# Patient Record
Sex: MALE | Race: WHITE | NOT HISPANIC OR LATINO | Employment: FULL TIME | URBAN - METROPOLITAN AREA
[De-identification: names, ages, dates, MRNs, and addresses within clinical notes are randomized per-mention and may not be internally consistent; named-entity substitution may affect disease eponyms.]

---

## 2019-07-19 ENCOUNTER — THERAPY VISIT (OUTPATIENT)
Dept: PHYSICAL THERAPY | Facility: CLINIC | Age: 44
End: 2019-07-19
Payer: OTHER MISCELLANEOUS

## 2019-07-19 DIAGNOSIS — M54.50 LOW BACK PAIN: ICD-10-CM

## 2019-07-19 PROCEDURE — 97161 PT EVAL LOW COMPLEX 20 MIN: CPT | Mod: GP | Performed by: PHYSICAL THERAPIST

## 2019-07-19 PROCEDURE — 97110 THERAPEUTIC EXERCISES: CPT | Mod: GP | Performed by: PHYSICAL THERAPIST

## 2019-07-19 PROCEDURE — 97530 THERAPEUTIC ACTIVITIES: CPT | Mod: GP | Performed by: PHYSICAL THERAPIST

## 2019-07-19 NOTE — LETTER
[facility] PHYSICAL THERAPY  [id number]  [place] [address]  [phone]    2019    Re: Steve Josue   :   1975  MRN:  [id number]   REFERRING PHYSICIAN:   Demetrius Rice    [facility] PHYSICAL THERAPY    Date of Initial Evaluation:  19  Visits:  Rxs Used: 1  Reason for Referral:  Low back pain    EVALUATION SUMMARY    [facility] Initial Evaluation  Subjective:  The history is provided by the patient.   Type of problem:  Lumbar  Condition occurred with:  Bending. This is a new condition   Problem details: Pt reports onset of LBP/tailbone pain after bending forward to load up his hitch on his truck. Happened at end of  a few weeks ago. Currently locates pain more so in tail bone/sacral area. Describes pain as like a bruise. Pain worse with bouncing up/down in truck, walking around for long distances, bending forward. Pain is intermittent, does not have a predictable pattern as to when it will come about and reported as 1/10 on pain scale. Pain better with ice and OTC medication. Pt works as a .    General health as reported by patient is good. Pertinent medical history includes:  Smoking.    Surgeries include:  None.  Current medications:  None.   Primary job tasks include:  Lifting/carrying, prolonged sitting and driving.                   Objective:    Lumbar/SI Evaluation  ROM:    AROM Lumbar:   Flexion:          To floor, 1/10 pain  Ext:                    100% ROM, 1/10 pain   Side Bend:        Left:  To knee, 0/10 pain    Right:  To knee, 0/10 pain  Rotation:           Left:     Right:   Side Glide:        Left:     Right:         Lumbar Provocation:  Lumbar provocation: Provocation of pain with PA glides at L3-L5.      Hip Evaluation    Hip Strength:    Flexion:   Left: 5/5   Pain:  Right: 5/5   Pain:  Extension:  Left: 4+/5  Pain:Right: 4+/5    Pain:    Abduction:  Left: 4+/5     Pain:Right: 4+/5    Pain:    Knee Flexion:   Left: 5/5   Pain:Right: 5/5   Pain:  Knee Extension:  Left: 5/5   Pain:Right: 5/5    Pain:      Assessment/Plan:    Patient is a 43 year old male with lumbar complaints.    Patient has the following significant findings with corresponding treatment plan.                Diagnosis 1:  LBP  Pain -  hot/cold therapy, US, electric stimulation, mechanical traction, manual therapy, splint/taping/bracing/orthotics, self management, education, directional preference exercise and home program  Decreased ROM/flexibility - manual therapy, therapeutic exercise, therapeutic activity and home program  Decreased joint mobility - manual therapy, therapeutic exercise, therapeutic activity and home program  Decreased strength - therapeutic exercise, therapeutic activities and home program  Impaired muscle performance - neuro re-education and home program  Decreased function - therapeutic activities and home program  Impaired posture - neuro re-education, therapeutic activities and home program    Therapy Evaluation Codes:   1) History comprised of:   Personal factors that impact the plan of care:      None.    Comorbidity factors that impact the plan of care are:      Smoking.     Medications impacting care: None.  2) Examination of Body Systems comprised of:   Body structures and functions that impact the plan of care:      Lumbar spine.   Activity limitations that impact the plan of care are:      Driving, Lifting, Sitting and Walking.  3) Clinical presentation characteristics are:   Stable/Uncomplicated.  4) Decision-Making    Low complexity using standardized patient assessment instrument and/or measureable assessment of functional outcome.  Cumulative Therapy Evaluation is: Low complexity.    Previous and current functional limitations:  (See Goal Flow Sheet for this information)    Short term and Long term goals: (See Goal Flow Sheet for this information)     Communication ability:  Patient appears to be able to clearly  communicate and understand verbal and written communication and follow directions correctly.  Treatment Explanation - The following has been discussed with the patient:   RX ordered/plan of care  Anticipated outcomes  Possible risks and side effects  This patient would benefit from PT intervention to resume normal activities.   Rehab potential is good.    Frequency:  1 X week, once daily  Duration:  for 6 weeks  Discharge Plan:  Achieve all LTG.  Independent in home treatment program.  Reach maximal therapeutic benefit.      Thank you for your referral.        INQUIRIES  Therapist: Fuad Brar DPT   [facility] PHYSICAL THERAPY  [address] [place] [id number]  Phone: [phone]  Fax: [phone]

## 2019-07-19 NOTE — PROGRESS NOTES
[facility] Initial Evaluation  Subjective:  The history is provided by the patient.   Type of problem:  Lumbar   Condition occurred with:  Bending. This is a new condition   Problem details: Pt reports onset of LBP/tailbone pain after bending forward to load up his hitch on his truck. Happened at end of June a few weeks ago. Currently locates pain more so in tail bone/sacral area. Describes pain as like a bruise. Pain worse with bouncing up/down in truck, walking around for long distances, bending forward. Pain is intermittent, does not have a predictable pattern as to when it will come about. Pain better with ice and OTC medication. Pt works as a ..             and reported as 1/10 on pain scale. General health as reported by patient is good. Pertinent medical history includes:  Smoking.    Surgeries include:  None.  Current medications:  None.   Primary job tasks include:  Lifting/carrying, prolonged sitting and driving.                                      Objective:  System         Lumbar/SI Evaluation  ROM:    AROM Lumbar:   Flexion:          To floor, 1/10 pain  Ext:                    100% ROM, 1/10 pain   Side Bend:        Left:  To knee, 0/10 pain    Right:  To knee, 0/10 pain  Rotation:           Left:     Right:   Side Glide:        Left:     Right:                         Lumbar Provocation:  Lumbar provocation: Provocation of pain with PA glides at L3-L5.                                          Hip Evaluation    Hip Strength:    Flexion:   Left: 5/5   Pain:  Right: 5/5   Pain:                    Extension:  Left: 4+/5  Pain:Right: 4+/5    Pain:    Abduction:  Left: 4+/5     Pain:Right: 4+/5    Pain:        Knee Flexion:  Left: 5/5   Pain:Right: 5/5   Pain:  Knee Extension:  Left: 5/5   Pain:Right: 5/5    Pain:                       General     ROS    Assessment/Plan:    Patient is a 43 year old male with lumbar complaints.    Patient has the following significant  findings with corresponding treatment plan.                Diagnosis 1:  LBP  Pain -  hot/cold therapy, US, electric stimulation, mechanical traction, manual therapy, splint/taping/bracing/orthotics, self management, education, directional preference exercise and home program  Decreased ROM/flexibility - manual therapy, therapeutic exercise, therapeutic activity and home program  Decreased joint mobility - manual therapy, therapeutic exercise, therapeutic activity and home program  Decreased strength - therapeutic exercise, therapeutic activities and home program  Impaired muscle performance - neuro re-education and home program  Decreased function - therapeutic activities and home program  Impaired posture - neuro re-education, therapeutic activities and home program    Therapy Evaluation Codes:   1) History comprised of:   Personal factors that impact the plan of care:      None.    Comorbidity factors that impact the plan of care are:      Smoking.     Medications impacting care: None.  2) Examination of Body Systems comprised of:   Body structures and functions that impact the plan of care:      Lumbar spine.   Activity limitations that impact the plan of care are:      Driving, Lifting, Sitting and Walking.  3) Clinical presentation characteristics are:   Stable/Uncomplicated.  4) Decision-Making    Low complexity using standardized patient assessment instrument and/or measureable assessment of functional outcome.  Cumulative Therapy Evaluation is: Low complexity.    Previous and current functional limitations:  (See Goal Flow Sheet for this information)    Short term and Long term goals: (See Goal Flow Sheet for this information)     Communication ability:  Patient appears to be able to clearly communicate and understand verbal and written communication and follow directions correctly.  Treatment Explanation - The following has been discussed with the patient:   RX ordered/plan of care  Anticipated  outcomes  Possible risks and side effects  This patient would benefit from PT intervention to resume normal activities.   Rehab potential is good.    Frequency:  1 X week, once daily  Duration:  for 6 weeks  Discharge Plan:  Achieve all LTG.  Independent in home treatment program.  Reach maximal therapeutic benefit.    Please refer to the daily flowsheet for treatment today, total treatment time and time spent performing 1:1 timed codes.

## 2019-07-26 ENCOUNTER — THERAPY VISIT (OUTPATIENT)
Dept: PHYSICAL THERAPY | Facility: CLINIC | Age: 44
End: 2019-07-26
Payer: OTHER MISCELLANEOUS

## 2019-07-26 DIAGNOSIS — M54.50 LOW BACK PAIN: ICD-10-CM

## 2019-07-26 PROCEDURE — 97110 THERAPEUTIC EXERCISES: CPT | Mod: GP | Performed by: PHYSICAL THERAPIST

## 2019-07-26 PROCEDURE — 97530 THERAPEUTIC ACTIVITIES: CPT | Mod: GP | Performed by: PHYSICAL THERAPIST

## 2019-07-30 ENCOUNTER — THERAPY VISIT (OUTPATIENT)
Dept: PHYSICAL THERAPY | Facility: CLINIC | Age: 44
End: 2019-07-30
Payer: OTHER MISCELLANEOUS

## 2019-07-30 DIAGNOSIS — M54.50 LOW BACK PAIN: ICD-10-CM

## 2019-07-30 PROCEDURE — 97530 THERAPEUTIC ACTIVITIES: CPT | Mod: GP | Performed by: PHYSICAL THERAPIST

## 2019-07-30 PROCEDURE — 97110 THERAPEUTIC EXERCISES: CPT | Mod: GP | Performed by: PHYSICAL THERAPIST

## 2019-12-12 PROBLEM — M54.50 LOW BACK PAIN: Status: RESOLVED | Noted: 2019-07-19 | Resolved: 2019-12-12

## 2019-12-12 NOTE — PROGRESS NOTES
Discharge Note    Progress reporting period is from initial eval to Jul 30, 2019.     Steve failed to return for next follow up visit and current status is unknown.  Please see information below for last relevant information on current status.  Patient seen for 3 visits.    SUBJECTIVE  Subjective changes noted by patient:  Pt overall feeling much better since last visit. Has been doing sequence of ex up to 3x/day. Sitting getting much more tolerable  .  Current pain level is 1/10.     Previous pain level was  1/10.   Changes in function:  Yes (See Goal flowsheet attached for changes in current functional level)  Adverse reaction to treatment or activity: None    OBJECTIVE  Changes noted in objective findings: Lumbar ROM: Flexion to toes 1/10 pain, RSB to knee 1/10 pain, LSB to knee 1/10 pain, extension 100% ROM no pain     ASSESSMENT/PLAN      DIAGP:  The encounter diagnosis was Low back pain.  STG/LTGs have been met or progress has been made towards goals:  Yes, please see goal flowsheet for most current information  Assessment of Progress: current status is unknown.    Last current status: Pt is progressing as expected   Self Management Plans:  HEP  I have re-evaluated this patient and find that the nature, scope, duration and intensity of the therapy is appropriate for the medical condition of the patient.  Steve continues to require the following intervention to meet STG and LTG's:  HEP.    Recommendations:  Discharge with current home program.  Patient to follow up with MD as needed.    Please refer to the daily flowsheet for treatment today, total treatment time and time spent performing 1:1 timed codes.

## 2021-08-19 ENCOUNTER — APPOINTMENT (OUTPATIENT)
Dept: GENERAL RADIOLOGY | Facility: CLINIC | Age: 46
End: 2021-08-19
Attending: EMERGENCY MEDICINE
Payer: COMMERCIAL

## 2021-08-19 ENCOUNTER — HOSPITAL ENCOUNTER (EMERGENCY)
Facility: CLINIC | Age: 46
Discharge: HOME | End: 2021-08-19
Attending: EMERGENCY MEDICINE | Admitting: EMERGENCY MEDICINE
Payer: COMMERCIAL

## 2021-08-19 VITALS
OXYGEN SATURATION: 95 % | WEIGHT: 218.26 LBS | DIASTOLIC BLOOD PRESSURE: 89 MMHG | HEART RATE: 74 BPM | SYSTOLIC BLOOD PRESSURE: 124 MMHG | RESPIRATION RATE: 16 BRPM | TEMPERATURE: 97.9 F

## 2021-08-19 DIAGNOSIS — M25.572 ACUTE LEFT ANKLE PAIN: ICD-10-CM

## 2021-08-19 DIAGNOSIS — L55.1 SUNBURN OF SECOND DEGREE: ICD-10-CM

## 2021-08-19 PROCEDURE — 73610 X-RAY EXAM OF ANKLE: CPT | Mod: LT

## 2021-08-19 PROCEDURE — 250N000013 HC RX MED GY IP 250 OP 250 PS 637: Performed by: EMERGENCY MEDICINE

## 2021-08-19 PROCEDURE — 99284 EMERGENCY DEPT VISIT MOD MDM: CPT | Performed by: EMERGENCY MEDICINE

## 2021-08-19 PROCEDURE — 99283 EMERGENCY DEPT VISIT LOW MDM: CPT | Performed by: EMERGENCY MEDICINE

## 2021-08-19 RX ORDER — ACETAMINOPHEN 500 MG
1000 TABLET ORAL ONCE
Status: COMPLETED | OUTPATIENT
Start: 2021-08-19 | End: 2021-08-19

## 2021-08-19 RX ADMIN — ACETAMINOPHEN 1000 MG: 500 TABLET, FILM COATED ORAL at 22:04

## 2021-08-20 NOTE — ED PROVIDER NOTES
History     Chief Complaint   Patient presents with     Ankle Pain     HPI  Steve Josue is a 45 year old male who presents for left ankle pain.  The patient reports that about 1 week ago he twisted his ankle and had onset of pain at that time.  He was evaluated and had x-rays that were negative for fracture.  He has been ambulating on this leg since it happened.  He ended up getting sunburned around the ankle and is blistered and become more swollen and painful over the last several days.  Hurts to walk on, pain is throbbing.  He has been using acetaminophen intermittently for this.  No other injuries.  No fever or chills.    Allergies:  No Known Allergies    Problem List:    There are no problems to display for this patient.       Past Medical History:    No past medical history on file.    Past Surgical History:    No past surgical history on file.    Family History:    No family history on file.    Social History:  Marital Status:  Single [1]  Social History     Tobacco Use     Smoking status: Not on file   Substance Use Topics     Alcohol use: Not on file     Drug use: Not on file        Medications:    No current outpatient medications on file.        Review of Systems  A 2 point review of systems was performed. All pertinent positives and negatives were listed in the HPI and rest of ROS were otherwise negative.    Physical Exam   BP: (!) 124/92  Pulse: 95  Temp: 97.9  F (36.6  C)  Resp: 16  Weight: 99 kg (218 lb 4.1 oz)  SpO2: 95 %      Physical Exam  Constitutional:       General: He is not in acute distress.     Appearance: He is well-developed. He is not diaphoretic.   HENT:      Head: Normocephalic and atraumatic.   Eyes:      General: No scleral icterus.  Musculoskeletal:      Cervical back: Normal range of motion and neck supple.      Comments: Left Knee: no deformity, effusion, erythema or warmth appreciated; no tenderness over patella, joint line, or femoral condyles; full ROM.  Left Ankle:  swelling with erythema and a blistering rash with tenderness over this; no tenderness base of 5th metatarsal, navicular, or ankle syndesmosis; full ankle ROM; anterior drawer test negative; full strength to dorsiflexion, plantar flexion, eversion and inversion.  No pain with axial loading of the ankle.   Skin:     General: Skin is warm and dry.      Findings: No rash.   Neurological:      Mental Status: He is alert and oriented to person, place, and time.         ED Course        Procedures              Critical Care time:  none               Results for orders placed or performed during the hospital encounter of 08/19/21 (from the past 24 hour(s))   XR Ankle Left G/E 3 Views    Narrative    EXAM: XR ANKLE LEFT G/E 3 VIEWS  LOCATION: [facility]  DATE/TIME: 8/19/2021 10:11 PM    INDICATION: Pain and swelling, twisting injury  COMPARISON: None.      Impression    IMPRESSION: No acute fracture or dislocation.       Medications   acetaminophen (TYLENOL) tablet 1,000 mg (1,000 mg Oral Given 8/19/21 2204)       Assessments & Plan (with Medical Decision Making)   45-year-old male presents for left ankle pain.  Vital signs are reassuring here.  He is given acetaminophen for the pain.  X-ray of the ankle obtained, images reviewed independently as well as radiology read reviewed, no signs of fracture or dislocation.  On exam his findings are consistent with blistering sunburn to the skin, no signs of cellulitis or septic arthritis at this time as he has good range of motion within the joint and no pain with axial loading of the joint.  He is safe to discharge home with instructions to use elevation, acetaminophen, return if worse, otherwise follow-up in clinic if not better over the next 3 to 4 days.  The patient is in agreement with this plan.    I have reviewed the nursing notes.    I have reviewed the findings, diagnosis, plan and need for follow up with the patient.       There are no discharge  medications for this patient.      Final diagnoses:   Acute left ankle pain   Sunburn of second degree       8/19/2021   [facility] EMERGENCY DEPT     Adrian Olvera MD  08/20/21 4417

## 2021-08-20 NOTE — ED TRIAGE NOTES
Twisted his ankle one week ago at work, got better  Last weekend got a sunburn on his ankle and has painful reddened area with fluid filled blisters

## 2022-06-23 ENCOUNTER — TELEPHONE (OUTPATIENT)
Dept: URGENT CARE | Facility: URGENT CARE | Age: 47
End: 2022-06-23

## 2022-06-23 ENCOUNTER — OFFICE VISIT (OUTPATIENT)
Dept: URGENT CARE | Facility: URGENT CARE | Age: 47
End: 2022-06-23
Payer: COMMERCIAL

## 2022-06-23 VITALS
DIASTOLIC BLOOD PRESSURE: 88 MMHG | WEIGHT: 210 LBS | OXYGEN SATURATION: 98 % | HEART RATE: 69 BPM | SYSTOLIC BLOOD PRESSURE: 137 MMHG | TEMPERATURE: 98.1 F

## 2022-06-23 DIAGNOSIS — R21 GROIN RASH: Primary | ICD-10-CM

## 2022-06-23 PROCEDURE — 99203 OFFICE O/P NEW LOW 30 MIN: CPT | Performed by: EMERGENCY MEDICINE

## 2022-06-23 PROCEDURE — 36415 COLL VENOUS BLD VENIPUNCTURE: CPT | Performed by: EMERGENCY MEDICINE

## 2022-06-23 PROCEDURE — 86696 HERPES SIMPLEX TYPE 2 TEST: CPT | Performed by: EMERGENCY MEDICINE

## 2022-06-23 PROCEDURE — 86695 HERPES SIMPLEX TYPE 1 TEST: CPT | Performed by: EMERGENCY MEDICINE

## 2022-06-23 RX ORDER — VALACYCLOVIR HYDROCHLORIDE 500 MG/1
500 TABLET, FILM COATED ORAL 2 TIMES DAILY
Qty: 6 TABLET | Refills: 0 | Status: SHIPPED | OUTPATIENT
Start: 2022-06-23 | End: 2022-06-26

## 2022-06-23 RX ORDER — DIAPER,BRIEF,INFANT-TODD,DISP
EACH MISCELLANEOUS 2 TIMES DAILY
Qty: 30 G | Refills: 1 | Status: SHIPPED | OUTPATIENT
Start: 2022-06-23

## 2022-06-23 NOTE — PROGRESS NOTES
"Assessment & Plan     Diagnosis:    (R21) Groin rash  (primary encounter diagnosis)  Plan: Herpes Simplex Virus 1 and 2 IgG, valACYclovir         (VALTREX) 500 MG tablet, hydrocortisone         (CORTAID) 0.5 % external ointment        Medical Decision Making:  Stvee Josue is a 46 year old male who presents for evaluation of rash on the perineum that has been intermittent and recurrent over the last ~10 years.  He said he tried fungal creams and powders many times, and this never seems to get better.  Notes that he never been tested for herpes simplex, would like to be today.  Given symptom onset was 2 to 3 weeks ago, I discussed Valtrex treatment will likely not be helpful currently, but he may start this for the next flare up of HSV test is positive.  This is clinically consistent with HSV with grouped vesicles on an erythematous base. HSV PCR was sent.  Follow up with primary to discuss PCR result and suppressive therapy. Recommended other STI testing per primary.  No signs of cellulitis in the HSV area.  Patient verbalizes understanding and agreement with the plan. All questions answered.       Khoa Samuel PA-C  [facility]    Subjective     Steve Josue is a 46 year old male who presents to clinic today for the following health issues:  Chief Complaint   Patient presents with     Rash     Groin area. Symptoms started 2 weeks ago.  Associated symptoms itchy burning        HPI    Rash    Onset of rash was 2 week(s) ago.   Course of illness is gradual onset.  Severity moderate  Current and Associated symptoms: itching, burning, blistering and \"bumpy, and sometimes they blister and there is some fluid.\"    Location of the rash: perineum.  Previous history of a similar rash? Yes -notes this rash has been present in the same location on and off for the past 10 years.  Treatment measures tried include: nystatin powder; never helps.  He notes that a topical steroid cream used to help greatly " with his symptoms.    He states he has never been tested for herpes.  He denies any penile discharge, abdominal pain, rectal pain, rashes or bumps on the shaft of the penis, testicular pain or concerns for STDs.    Review of Systems    See HPI    Objective      Vitals: /88   Pulse 69   Temp 98.1  F (36.7  C) (Tympanic)   Wt 95.3 kg (210 lb)   SpO2 98%     Patient Vitals for the past 24 hrs:   BP Temp Temp src Pulse SpO2 Weight   06/23/22 1305 137/88 98.1  F (36.7  C) Tympanic 69 98 % 95.3 kg (210 lb)       Vital signs reviewed by: Khoa Samuel PA-C    Physical Exam   Constitutional: Patient is alert and cooperative. No acute distress.  Mouth: Mucous membranes are moist. Normal tongue and tonsil. Posterior oropharynx is clear.  Eyes: Conjunctivae, EOMI and lids are normal. PERRL.  GI: Abdomen is soft and non-tender throughout. No CVA tenderness bilaterally.  Neurological: Alert and oriented x3.   : There are small grouped vesicles on erythematous base in the perennial area.  Slight chafing skin, no open blisters, sores, lesions or chancre.  No surrounding erythema or perirectal induration/erythema.  Skin: No rash noted on visualized skin.  Psychiatric:The patient has a normal mood and affect.     Labs/Imaging:  HSV 1/2 PCR: Pending      Khoa Samuel PA-C, June 23, 2022

## 2022-06-23 NOTE — PATIENT INSTRUCTIONS
HSV-2 is genital herpes    HSV-1 is oral herpes typically (this is positive in a majority of the population), commonly causes cold sores.

## 2022-06-23 NOTE — TELEPHONE ENCOUNTER
Pharmacy wondering if they can change Rx hydrocortisone from Ointment to cream.      Amisha Donaldson, [facility]

## 2022-06-24 LAB
HSV1 IGG SERPL QL IA: 0.19 INDEX
HSV1 IGG SERPL QL IA: NORMAL
HSV2 IGG SERPL QL IA: 0.1 INDEX
HSV2 IGG SERPL QL IA: NORMAL

## 2023-06-12 ENCOUNTER — OFFICE VISIT (OUTPATIENT)
Dept: URGENT CARE | Facility: URGENT CARE | Age: 48
End: 2023-06-12
Payer: COMMERCIAL

## 2023-06-12 VITALS
WEIGHT: 210 LBS | HEART RATE: 77 BPM | SYSTOLIC BLOOD PRESSURE: 132 MMHG | TEMPERATURE: 97.8 F | RESPIRATION RATE: 16 BRPM | OXYGEN SATURATION: 96 % | DIASTOLIC BLOOD PRESSURE: 88 MMHG

## 2023-06-12 DIAGNOSIS — L28.0 LICHENIFICATION AND LICHEN SIMPLEX CHRONICUS: Primary | ICD-10-CM

## 2023-06-12 PROCEDURE — 99213 OFFICE O/P EST LOW 20 MIN: CPT

## 2023-06-12 RX ORDER — CLOBETASOL PROPIONATE 0.5 MG/G
OINTMENT TOPICAL 2 TIMES DAILY
Qty: 60 G | Refills: 0 | Status: SHIPPED | OUTPATIENT
Start: 2023-06-12

## 2023-06-12 NOTE — PROGRESS NOTES
Assessment & Plan   (L28.0) Lichenification and lichen simplex chronicus  (primary encounter diagnosis)  Plan: clobetasol (TEMOVATE) 0.05 % external ointment    I reviewed the dermatology visit that the patient was describing from November 2018.  The patient and I discussed this visit and I showed him the details within the chart.  The patient indicated that that was the dermatology visit he was talking about within the [facility] system that he went to the [facility] for.  He indicates that was the visit where he was prescribed the ointment that took care of his rash that he presented with then and has again today in the clinic.  We discussed that it would be beneficial to try the same course of treatment today if it had worked for him in the past.  The patient was agreeable to this plan.  Instructed the patient to use a bland moisturizer such as Vaseline, Vanicream, CeraVe, Eucerin, Curel, Aveeno to the areas twice daily.  Instructed him to use the clobetasol ointment and only apply it to the areas of visible rash for no more than 21 days.  Emphasized that this medication should not be applied to normal skin as this may cause permanent skin thinning and striae formation.  We discussed that the rate of the side effects is low if the topical steroid is used correctly.  Instructed him to discontinue the use of the steroid as soon as the rash resolves.  Informed him to follow-up with dermatology should symptoms persist.  Patient acknowledged his understanding of the above plan.    The use of [employer] dictation services may have been used to construct the content in this note; any grammatical or spelling errors are non-intentional. Please contact the author of this note directly if you are in need of any clarification.      PERRI Kitchen CNP  [facility] [place]    Frank Wilson is a 47 year old male who presents to clinic today for the following health  issues:    HPI  The patient reports having a rash in his groin and buttock region that has not responded to antifungals, moisturizers and other types of creams that the patient has used over-the-counter as well as that he has been prescribed through multiple urgent care visits.  He indicates that he had this issue one time in the past where he saw a dermatologist for it.  He believes that the dermatologist was through the [facility] system in [place].  He indicates that the dermatologist prescribed something for him that resolved the rash within 2 to 3 days.    ROS:  Negative except noted above.    Review of Systems        Objective    There were no vitals taken for this visit.  Physical Exam   GENERAL: healthy, alert and no distress  SKIN: lichenification noted in the gluteal fold and perineum

## 2023-06-13 NOTE — PATIENT INSTRUCTIONS
Use a bland moisturizer such as petroleum jelly (Vaseline), Vanicream, CeraVe, Eucerin, Curel, Aveeno to the areas twice daily.  Use the Clobetasol ointment applied to the areas of visible rash for no more than 21 days. This medication should not be applied to normal skin as this may cause permanent skin thinning and stria formation. The rate of these side effects is low if the topical steroids are used correctly.  Use of the topical steroids should be discontinued as soon as the rash resolves in a given area.  Follow up with dermatology should symptoms persist.

## 2023-06-14 ENCOUNTER — TELEPHONE (OUTPATIENT)
Dept: URGENT CARE | Facility: URGENT CARE | Age: 48
End: 2023-06-14
Payer: COMMERCIAL

## 2023-06-14 ENCOUNTER — TELEPHONE (OUTPATIENT)
Dept: FAMILY MEDICINE | Facility: CLINIC | Age: 48
End: 2023-06-14
Payer: COMMERCIAL

## 2023-06-14 DIAGNOSIS — L28.0 LICHENIFICATION AND LICHEN SIMPLEX CHRONICUS: Primary | ICD-10-CM

## 2023-06-14 NOTE — TELEPHONE ENCOUNTER
Provider:  Please place a dermatology referral if appropriate and route back to your team to inform the patient of your decision.  Thank you. Rylee Martinez R.N.    Patient reports that he is trying to make an appointment with his previous dermatologist but he is now in a different practice. The ointment given to him helped the redness and slightly helped the raw spots, but still has bumps and still itches like crazy. The patient is requesting a referral to a dermatologist if possible. Patient voiced frustration in how long it take to get into dermatology.    Nursing support:  I will send a message to the provider that had seen him in urgent care to request a dermatology referral.  I did explain to him that most specialties are booking out quite a ways.  I advised him to also call his insurance to see what dermatology providers he can be seen by and try calling around to them also to see where he can get in the soonest even possibly going on a waiting lists.  [facility] to call there for a dermatology provider if they are willing.  Patient verbalized good understanding, agrees with plan and needs no further support.  Thank you. Rylee Martinez R.N.    This encounter is being handled by a team outside your facility.  If action needs to be taken, please route the encounter back to your team that would normally handle it. Please do not send directly back to the sender. Thank you. Rylee Martinez R.N.    Ok to leave a message with the provider's response.

## 2023-06-14 NOTE — TELEPHONE ENCOUNTER
Patient called regarding rash he was seen for in UC on 6/12/23.  Was wanting to know who he saw in 2018 regarding this same problem.  Information was found in Care Everywhere and information was relayed to patient.    Patient was also encouraged to establish care with a primary provider to avoid having to go to Urgent Care every year regarding this issue.      Kristina Kjellberg, MSN, RN

## 2023-06-15 ENCOUNTER — TELEPHONE (OUTPATIENT)
Dept: DERMATOLOGY | Facility: CLINIC | Age: 48
End: 2023-06-15
Payer: COMMERCIAL

## 2023-06-15 NOTE — TELEPHONE ENCOUNTER
This encounter is being sent to inform the clinic that this patient has a referral from Mirza Mauro APRN CNP,  for the diagnoses of Lichenification and lichen simplex chronicus [L28.0] and has requested that this patient be seen within 1-2 weeks and/or with any.  Based on the availability of our provider(s), we are unable to accommodate this request.      Were all sites offered this patient?  Yes      Does scheduling algorithm request to schedule next available?  Patient has been scheduled for the first available opening with Sayda on 11/08/2023.  We have informed the patient that the clinic will review their referral and reach out if a sooner appointment is medically necessary.     Patient closest to [place]

## 2023-06-15 NOTE — TELEPHONE ENCOUNTER
Patient Contact    Attempt # 1    Was call answered?  No    Called patient. No answer. Left message to call back. Clinic number was provided.     Sonia Thayer LPN   [facility]  [phone]

## 2023-06-16 NOTE — TELEPHONE ENCOUNTER
Patient Contact    Attempt # 2    Was call answered?  No    Called patient. No answer. Left message to call back. Clinic number was provided.    Sonia Thayer LPN   [facility]   [phone]

## 2023-06-28 ENCOUNTER — OFFICE VISIT (OUTPATIENT)
Dept: DERMATOLOGY | Facility: CLINIC | Age: 48
End: 2023-06-28
Payer: COMMERCIAL

## 2023-06-28 DIAGNOSIS — L24.9 IRRITANT DERMATITIS: Primary | ICD-10-CM

## 2023-06-28 PROCEDURE — 99243 OFF/OP CNSLTJ NEW/EST LOW 30: CPT | Performed by: DERMATOLOGY

## 2023-06-28 ASSESSMENT — PAIN SCALES - GENERAL: PAINLEVEL: NO PAIN (0)

## 2023-06-28 NOTE — PROGRESS NOTES
Steve Josue , a 47 year old year old male patient, I was asked to see by CARMEN Beauchamp for rash in groin.  Using dove and dial soap, no emollients.  Given clobetasol and rash gone.  Patient has no other skin complaints today.  Remainder of the HPI, Meds, PMH, Allergies, FH, and SH was reviewed in chart.    No past medical history on file.    No past surgical history on file.     No family history on file.    Social History     Socioeconomic History     Marital status: Single     Spouse name: Not on file     Number of children: Not on file     Years of education: Not on file     Highest education level: Not on file   Occupational History     Not on file   Tobacco Use     Smoking status: Not on file     Smokeless tobacco: Not on file   Substance and Sexual Activity     Alcohol use: Not on file     Drug use: Not on file     Sexual activity: Not on file   Other Topics Concern     Not on file   Social History Narrative     Not on file     Social Determinants of Health     Financial Resource Strain: Not on file   Food Insecurity: Not on file   Transportation Needs: Not on file   Physical Activity: Not on file   Stress: Not on file   Social Connections: Not on file   Intimate Partner Violence: Not on file   Housing Stability: Not on file       Outpatient Encounter Medications as of 6/28/2023   Medication Sig Dispense Refill     clobetasol (TEMOVATE) 0.05 % external ointment Apply topically 2 times daily 60 g 0     hydrocortisone (CORTAID) 0.5 % external ointment Apply topically 2 times daily 30 g 1     valACYclovir (VALTREX) 500 MG tablet Take 1 tablet (500 mg) by mouth 2 times daily for 3 days 6 tablet 0     No facility-administered encounter medications on file as of 6/28/2023.             Review Of Systems  Skin: As above  Eyes: negative  Ears/Nose/Throat: negative  Respiratory: No shortness of breath, dyspnea on exertion, cough, or hemoptysis  Cardiovascular: negative  Gastrointestinal: negative  Genitourinary:  negative  Musculoskeletal: negative  Neurologic: negative  Psychiatric: negative  Hematologic/Lymphatic/Immunologic: negative  Endocrine: negative      O:   NAD, WDWN, Alert & Oriented, Mood & Affect wnl, Vitals stable   Here today alone   General appearance tyree ii   Vitals stable   Alert, oriented and in no acute distress   Rash clear in groin      Eyes: Conjunctivae/lids:Normal     ENT: Lips, buccal mucosa, tongue: normal    MSK:Normal    Cardiovascular: peripheral edema none    Pulm: Breathing Normal    Neuro/Psych: Orientation:Normal; Mood/Affect:Normal      A/P:  1. Irritant derm  Skin care discussed with patient   Soap use discussed with patient   Emollient use discussed with patient   topiclas prn   mychart us if rash recurs  It was a pleasure speaking to Steve Josue today.  Previous clinic  notes and pertinent laboratory tests were reviewed prior to Steve Josue's visit.  Skin care regimen reviewed with patient: Eliminate harsh soaps, i.e. Dial, zest, irsih spring; Mild soaps such as Cetaphil or Dove sensitive skin, avoid hot or cold showers, aggressive use of emollients including vanicream, cetaphil or cerave discussed with patient.

## 2023-06-28 NOTE — PATIENT INSTRUCTIONS
If rash comes back, mychart Dr. Alfredo or call and speak to the dermatology care team    Proper skin care from Dr. Alfredo- [facility]     Eliminate harsh soaps, i.e. Dial, Zest, Ly Spring;   Use mild soaps such as Cetaphil or Dove Sensitive Skin   Avoid hot or cold showers   After showering, lightly dry off.    Aggressive use of a moisturizer (including Vanicream, Cetaphil, Aquaphor or Cerave)   We recommend using a tub that needs to be scooped out, not a pump. This has more of an oil base. It will hold moisture in your skin much better than a water base moisturizer. The ones recommended are non- pore clogging.       If you have any questions call [phone] and follow the prompts to Dr. Alfredo's office.

## 2023-06-28 NOTE — LETTER
6/28/2023         RE: Steve Josue  [address]  [facility] [address]        Dear Colleague,    Thank you for referring your patient, Steve Josue, to the [facility]. Please see a copy of my visit note below.    Steve Josue , a 47 year old year old male patient, I was asked to see by CAREMN Beauchamp for rash in groin.  Using dove and dial soap, no emollients.  Given clobetasol and rash gone.  Patient has no other skin complaints today.  Remainder of the HPI, Meds, PMH, Allergies, FH, and SH was reviewed in chart.    No past medical history on file.    No past surgical history on file.     No family history on file.    Social History     Socioeconomic History     Marital status: Single     Spouse name: Not on file     Number of children: Not on file     Years of education: Not on file     Highest education level: Not on file   Occupational History     Not on file   Tobacco Use     Smoking status: Not on file     Smokeless tobacco: Not on file   Substance and Sexual Activity     Alcohol use: Not on file     Drug use: Not on file     Sexual activity: Not on file   Other Topics Concern     Not on file   Social History Narrative     Not on file     Social Determinants of Health     Financial Resource Strain: Not on file   Food Insecurity: Not on file   Transportation Needs: Not on file   Physical Activity: Not on file   Stress: Not on file   Social Connections: Not on file   Intimate Partner Violence: Not on file   Housing Stability: Not on file       Outpatient Encounter Medications as of 6/28/2023   Medication Sig Dispense Refill     clobetasol (TEMOVATE) 0.05 % external ointment Apply topically 2 times daily 60 g 0     hydrocortisone (CORTAID) 0.5 % external ointment Apply topically 2 times daily 30 g 1     valACYclovir (VALTREX) 500 MG tablet Take 1 tablet (500 mg) by mouth 2 times daily for 3 days 6 tablet 0     No facility-administered encounter medications on file as of 6/28/2023.              Review Of Systems  Skin: As above  Eyes: negative  Ears/Nose/Throat: negative  Respiratory: No shortness of breath, dyspnea on exertion, cough, or hemoptysis  Cardiovascular: negative  Gastrointestinal: negative  Genitourinary: negative  Musculoskeletal: negative  Neurologic: negative  Psychiatric: negative  Hematologic/Lymphatic/Immunologic: negative  Endocrine: negative      O:   NAD, WDWN, Alert & Oriented, Mood & Affect wnl, Vitals stable   Here today alone   General appearance tyree ii   Vitals stable   Alert, oriented and in no acute distress   Rash clear in groin      Eyes: Conjunctivae/lids:Normal     ENT: Lips, buccal mucosa, tongue: normal    MSK:Normal    Cardiovascular: peripheral edema none    Pulm: Breathing Normal    Neuro/Psych: Orientation:Normal; Mood/Affect:Normal      A/P:  1. Irritant derm  Skin care discussed with patient   Soap use discussed with patient   Emollient use discussed with patient   topiclas prn   mychart us if rash recurs  It was a pleasure speaking to Steve Josue today.  Previous clinic  notes and pertinent laboratory tests were reviewed prior to Steve Josue's visit.  Skin care regimen reviewed with patient: Eliminate harsh soaps, i.e. Dial, zest, irsih spring; Mild soaps such as Cetaphil or Dove sensitive skin, avoid hot or cold showers, aggressive use of emollients including vanicream, cetaphil or cerave discussed with patient.          Again, thank you for allowing me to participate in the care of your patient.        Sincerely,        Endy Alfredo MD

## 2023-08-03 ENCOUNTER — OFFICE VISIT (OUTPATIENT)
Dept: URGENT CARE | Facility: URGENT CARE | Age: 48
End: 2023-08-03
Payer: COMMERCIAL

## 2023-08-03 VITALS
DIASTOLIC BLOOD PRESSURE: 72 MMHG | HEART RATE: 64 BPM | OXYGEN SATURATION: 96 % | RESPIRATION RATE: 14 BRPM | TEMPERATURE: 97 F | WEIGHT: 210 LBS | SYSTOLIC BLOOD PRESSURE: 115 MMHG

## 2023-08-03 DIAGNOSIS — B35.3 TINEA PEDIS OF BOTH FEET: Primary | ICD-10-CM

## 2023-08-03 PROCEDURE — 99213 OFFICE O/P EST LOW 20 MIN: CPT | Performed by: NURSE PRACTITIONER

## 2023-08-03 RX ORDER — PRENATAL VIT 91/IRON/FOLIC/DHA 28-975-200
COMBINATION PACKAGE (EA) ORAL 2 TIMES DAILY
Qty: 30 G | Refills: 1 | Status: SHIPPED | OUTPATIENT
Start: 2023-08-03 | End: 2023-08-17

## 2023-08-03 ASSESSMENT — ENCOUNTER SYMPTOMS
FEVER: 0
CHILLS: 0
FATIGUE: 0

## 2023-08-03 NOTE — PATIENT INSTRUCTIONS
Follow up with dermatology for possible oral anti fungal treatment. In the meantime use the Rx as prescribed.

## 2023-08-03 NOTE — PROGRESS NOTES
Assessment & Plan       ICD-10-CM    1. Tinea pedis of both feet  B35.3 terbinafine (LAMISIL) 1 % external cream     Orthopedic  Referral           Patient instructions:  Follow up with dermatology for possible oral anti fungal treatment. In the meantime use the Rx as prescribed.     Medical decision making:  Pt presents with cracking and burning pain to bilateral feet x past 3 weeks. He ran out of his tinea cream about 1 week ago so has just been applying Vaseline at this time. On exam this appears to be a tinea pedis infection. As he has only been applying the cream for about 2 weeks, refilled the tinea cream and instructed to follow up with podiatry for possible oral antifungal treatment. If he develops a fever, purulent drainage or worsening symptoms instructed to return for reevaluation. Pt agrees with plan.     No follow-ups on file.    At the end of the encounter, I discussed results, diagnosis, medications. Discussed red flags for immediate return to clinic/ER, as well as indications for follow up if no improvement. Patient understood and agreed to plan. Patient was stable for discharge.    Frank Wilson is a 47 year old male who presents to clinic today the following health issues:  Chief Complaint   Patient presents with    Derm Problem     Bottoms of feet cracking, itching, burning x3 weeks. Now skin is peeling.  [facility]     [facility] reports that he was seen about 1 month ago for itching on his feet and prescribed 2 different creams (anti fungal and steroid cream), he applied those for about 2 weeks, then ran out of the medication. [facility] but [facility] have not gotten [facility] to him yet. Now he's having more cracking to the bottoms of his feet and pain with ambulation. No fevers, no drainage from the cracks.             Review of Systems   Constitutional:  Negative for chills, fatigue and fever.   Musculoskeletal:  Positive for gait problem.       Problem  List:  2019-07: Low back pain      No past medical history on file.    Social History     Tobacco Use    Smoking status: Former     Types: Cigarettes    Smokeless tobacco: Current   Substance Use Topics    Alcohol use: Not on file           Objective    /72   Pulse 64   Temp 97  F (36.1  C) (Tympanic)   Resp 14   Wt 95.3 kg (210 lb)   SpO2 96%   Physical Exam  Constitutional:       Appearance: Normal appearance. He is not ill-appearing, toxic-appearing or diaphoretic.   Cardiovascular:      Rate and Rhythm: Normal rate.   Pulmonary:      Effort: Pulmonary effort is normal.   Feet:      Right foot:      Skin integrity: Dry skin and fissure present.      Left foot:      Skin integrity: Dry skin and fissure present.      Comments: Bilateral feet show cracking, dryness, yellowish build up of skin. No surrounding signs of infection or purulent drainage.   Neurological:      Mental Status: He is alert.              PERRI RAYMOND CNP

## 2023-08-09 ENCOUNTER — OFFICE VISIT (OUTPATIENT)
Dept: PODIATRY | Facility: CLINIC | Age: 48
End: 2023-08-09
Attending: NURSE PRACTITIONER
Payer: COMMERCIAL

## 2023-08-09 VITALS
DIASTOLIC BLOOD PRESSURE: 89 MMHG | BODY MASS INDEX: 29.4 KG/M2 | SYSTOLIC BLOOD PRESSURE: 127 MMHG | HEIGHT: 71 IN | HEART RATE: 79 BPM | WEIGHT: 210 LBS

## 2023-08-09 DIAGNOSIS — B35.3 TINEA PEDIS OF BOTH FEET: ICD-10-CM

## 2023-08-09 PROCEDURE — 99203 OFFICE O/P NEW LOW 30 MIN: CPT | Performed by: PODIATRIST

## 2023-08-09 RX ORDER — PRENATAL VIT 91/IRON/FOLIC/DHA 28-975-200
COMBINATION PACKAGE (EA) ORAL 2 TIMES DAILY
Qty: 12 G | Refills: 1 | Status: SHIPPED | OUTPATIENT
Start: 2023-08-09

## 2023-08-09 RX ORDER — KETOCONAZOLE 20 MG/G
CREAM TOPICAL DAILY
COMMUNITY

## 2023-08-09 RX ORDER — UREA 40 %
CREAM (GRAM) TOPICAL
COMMUNITY

## 2023-08-09 ASSESSMENT — PAIN SCALES - GENERAL: PAINLEVEL: SEVERE PAIN (7)

## 2023-08-09 NOTE — NURSING NOTE
"Chief Complaint   Patient presents with    Consult     Bilateral foot pain- feels like he is walking on razor blades       Initial /89   Pulse 79   Ht 1.803 m (5' 11\")   Wt 95.3 kg (210 lb)   BMI 29.29 kg/m   Estimated body mass index is 29.29 kg/m  as calculated from the following:    Height as of this encounter: 1.803 m (5' 11\").    Weight as of this encounter: 95.3 kg (210 lb).  Medications and allergies reviewed.      Nya STAPLETON MA    "

## 2023-08-09 NOTE — PROGRESS NOTES
"PATIENT HISTORY:  Steve Josue is a 47 year old male who presents to clinic in consultation at the request of  Ilene Strickland C.N.P. with a chief complaint of bilateral feet pain.  The patient is seen by themselves.  The patient relates the pain is primarily located around the bottom of the feet.  Reports insidious onset without acute precipitating event.  The patient relates that the symptoms have been going on for several month(s).  The patient has previously tried different shoes, medical creams, and Vaseline with little relief.  The patient is currently employed as  .  Any previous notes and studies that pertain to the patient's condition were reviewed.    Pertinent medical, surgical and family history was reviewed in the Epic chart.    Past Medical History: History reviewed. No pertinent past medical history.    Medications:   Current Outpatient Medications:     terbinafine (LAMISIL AT) 1 % external cream, Apply topically 2 times daily, Disp: 12 g, Rfl: 1    clobetasol (TEMOVATE) 0.05 % external ointment, Apply topically 2 times daily (Patient not taking: Reported on 8/3/2023), Disp: 60 g, Rfl: 0    hydrocortisone (CORTAID) 0.5 % external ointment, Apply topically 2 times daily (Patient not taking: Reported on 8/3/2023), Disp: 30 g, Rfl: 1    ketoconazole (NIZORAL) 2 % external cream, Apply topically daily (Patient not taking: Reported on 8/9/2023), Disp: , Rfl:     Urea 40 % CREA, , Disp: , Rfl:     valACYclovir (VALTREX) 500 MG tablet, Take 1 tablet (500 mg) by mouth 2 times daily for 3 days, Disp: 6 tablet, Rfl: 0     Allergies:    Allergies   Allergen Reactions    Ibuprofen Hives and Rash     Pt reports rash, possible hives      Ragweeds Other (See Comments)       Vitals: /89   Pulse 79   Ht 1.803 m (5' 11\")   Wt 95.3 kg (210 lb)   BMI 29.29 kg/m    BMI= Body mass index is 29.29 kg/m .    LOWER EXTREMITY PHYSICAL EXAM    Dermatologic: Noted hyperkeratotic xerotic skin on the " plantar aspect of both feet and in between the toes of both feet.  Skin is otherwise intact to right and left lower extremity without significant lesions, rash or abrasion.        Vascular: DP & PT pulses are intact & regular on the right and left.   CFT and skin temperature is normal to the right and left lower extremity.     Neurologic: Lower extremity sensation is intact to light touch.  No evidence of weakness in the right and left lower extremity.        Musculoskeletal: Patient is ambulatory without assistive device or brace.  No gross ankle deformity noted.  No foot or ankle joint effusion is noted.              ASSESSMENT / PLAN:     ICD-10-CM    1. Tinea pedis of both feet  B35.3 Orthopedic  Referral     terbinafine (LAMISIL AT) 1 % external cream          I have explained to Steve about the conditions.  We discussed the underlying contributing factors to the condition as well as both conservative and surgical treatment options along with expected length of recovery.  At this time, patient was prescribed topical terbinafine 1% cream to be applied to the soles in between the toes of both feet twice daily for 2 weeks.  Patient may return to the office of problems persist.    Steve verbalized agreement with and understanding of the rational for the diagnosis and treatment plan.  All questions were answered to best of my ability and the patient's satisfaction. The patient was advised to contact the clinic with any questions that may arise after the clinic visit.      Disclaimer: This note consists of symbols derived from keyboarding, dictation and/or voice recognition software. As a result, there may be errors in the script that have gone undetected. Please consider this when interpreting information found in this chart.       RICHARD Logan D.P.M., F.JC.[email or web address].S.

## 2023-08-09 NOTE — PATIENT INSTRUCTIONS
Next Steps:    Apply topical antifungal medication twice daily for two weeks.  Return in two weeks for reevaluation.

## 2023-08-09 NOTE — LETTER
8/9/2023         RE: Steve Josue  [address]  [place] [address]        Dear Colleague,    Thank you for referring your patient, Steve Josue, to the [facility]. Please see a copy of my visit note below.    PATIENT HISTORY:  Steve Josue is a 47 year old male who presents to clinic in consultation at the request of  Ilene Strickland C.N.P. with a chief complaint of bilateral feet pain.  The patient is seen by themselves.  The patient relates the pain is primarily located around the bottom of the feet.  Reports insidious onset without acute precipitating event.  The patient relates that the symptoms have been going on for several month(s).  The patient has previously tried different shoes, medical creams, and Vaseline with little relief.  The patient is currently employed as  .  Any previous notes and studies that pertain to the patient's condition were reviewed.    Pertinent medical, surgical and family history was reviewed in the Epic chart.    Past Medical History: History reviewed. No pertinent past medical history.    Medications:   Current Outpatient Medications:      terbinafine (LAMISIL AT) 1 % external cream, Apply topically 2 times daily, Disp: 12 g, Rfl: 1     clobetasol (TEMOVATE) 0.05 % external ointment, Apply topically 2 times daily (Patient not taking: Reported on 8/3/2023), Disp: 60 g, Rfl: 0     hydrocortisone (CORTAID) 0.5 % external ointment, Apply topically 2 times daily (Patient not taking: Reported on 8/3/2023), Disp: 30 g, Rfl: 1     ketoconazole (NIZORAL) 2 % external cream, Apply topically daily (Patient not taking: Reported on 8/9/2023), Disp: , Rfl:      Urea 40 % CREA, , Disp: , Rfl:      valACYclovir (VALTREX) 500 MG tablet, Take 1 tablet (500 mg) by mouth 2 times daily for 3 days, Disp: 6 tablet, Rfl: 0     Allergies:    Allergies   Allergen Reactions     Ibuprofen Hives and Rash     Pt reports rash, possible hives        "Ragweeds Other (See Comments)       Vitals: /89   Pulse 79   Ht 1.803 m (5' 11\")   Wt 95.3 kg (210 lb)   BMI 29.29 kg/m    BMI= Body mass index is 29.29 kg/m .    LOWER EXTREMITY PHYSICAL EXAM    Dermatologic: Noted hyperkeratotic xerotic skin on the plantar aspect of both feet and in between the toes of both feet.  Skin is otherwise intact to right and left lower extremity without significant lesions, rash or abrasion.        Vascular: DP & PT pulses are intact & regular on the right and left.   CFT and skin temperature is normal to the right and left lower extremity.     Neurologic: Lower extremity sensation is intact to light touch.  No evidence of weakness in the right and left lower extremity.        Musculoskeletal: Patient is ambulatory without assistive device or brace.  No gross ankle deformity noted.  No foot or ankle joint effusion is noted.              ASSESSMENT / PLAN:     ICD-10-CM    1. Tinea pedis of both feet  B35.3 Orthopedic  Referral     terbinafine (LAMISIL AT) 1 % external cream          I have explained to Steve about the conditions.  We discussed the underlying contributing factors to the condition as well as both conservative and surgical treatment options along with expected length of recovery.  At this time, patient was prescribed topical terbinafine 1% cream to be applied to the soles in between the toes of both feet twice daily for 2 weeks.  Patient may return to the office of problems persist.    Steve verbalized agreement with and understanding of the rational for the diagnosis and treatment plan.  All questions were answered to best of my ability and the patient's satisfaction. The patient was advised to contact the clinic with any questions that may arise after the clinic visit.      Disclaimer: This note consists of symbols derived from keyboarding, dictation and/or voice recognition software. As a result, there may be errors in the script that have gone " undetected. Please consider this when interpreting information found in this chart.       RICHARD Logan D.P.M., F.JC.C.F.A.S.      Again, thank you for allowing me to participate in the care of your patient.        Sincerely,        Demetrius Logan DPM

## 2023-10-09 ENCOUNTER — OFFICE VISIT (OUTPATIENT)
Dept: URGENT CARE | Facility: URGENT CARE | Age: 48
End: 2023-10-09
Payer: COMMERCIAL

## 2023-10-09 VITALS
WEIGHT: 213.8 LBS | DIASTOLIC BLOOD PRESSURE: 97 MMHG | SYSTOLIC BLOOD PRESSURE: 134 MMHG | BODY MASS INDEX: 29.82 KG/M2 | HEART RATE: 70 BPM | OXYGEN SATURATION: 99 % | RESPIRATION RATE: 14 BRPM | TEMPERATURE: 97.7 F

## 2023-10-09 DIAGNOSIS — L30.1 ECZEMA, DYSHIDROTIC: Primary | ICD-10-CM

## 2023-10-09 PROCEDURE — 99214 OFFICE O/P EST MOD 30 MIN: CPT | Performed by: NURSE PRACTITIONER

## 2023-10-09 RX ORDER — PRENATAL VIT 91/IRON/FOLIC/DHA 28-975-200
COMBINATION PACKAGE (EA) ORAL 2 TIMES DAILY
Qty: 30 G | Refills: 0 | Status: CANCELLED | OUTPATIENT
Start: 2023-10-09

## 2023-10-09 RX ORDER — TRIAMCINOLONE ACETONIDE 1 MG/G
CREAM TOPICAL 2 TIMES DAILY
Qty: 30 G | Refills: 0 | Status: SHIPPED | OUTPATIENT
Start: 2023-10-09

## 2023-10-09 NOTE — PROGRESS NOTES
SUBJECTIVE:    Steve Josue is a 48 year old male who is seen for foot issue.     Has had cracking bottom of feet X 3 months  Unknown cause  Thought it was fungal infection, been seen 3 times and they keep giving him lamisil (same treatment)  He has gone through tubes of it with no improvement  Dry cracking very painful bottom of feet to walk  Describes it as blistering and then opens and drains  Has tried neosporin aloe vera tinactin spray vaseline      REVIEW OF SYSTEMS:  CONSTITUTIONAL:NEGATIVE for fever, chills, change in weight  INTEGUMENTARY/SKIN: NEGATIVE for worrisome rashes, moles or lesions  MUSCULOSKELETAL:See HPI above  NEURO: NEGATIVE for weakness, dizziness or paresthesias    BP (!) 134/97 (BP Location: Right arm, Cuff Size: Adult Large)   Pulse 70   Temp 97.7  F (36.5  C) (Tympanic)   Resp 14   Wt 97 kg (213 lb 12.8 oz)   SpO2 99%   BMI 29.82 kg/m    EXAM:  GENERAL APPEARANCE: , alert, and no distress   GAIT: NORMAL  SKIN (FEET): Erythema, scale, and fissures bilateral soles of feet.. Does not appear infected  NEURO: Normal strength and tone, mentation intact, and speech normal  PSYCH:  mentation appears normal and affect normal/bright      ASSESSMENT/PLAN  (L30.1) Eczema, dyshidrotic  (primary encounter diagnosis)    Plan:   Feet do not appear to be fungal to me,  3 months of treatment failure? Unless is in need of oral antifungals  Treat with triamcinolone (KENALOG) 0.1 % external cream twice daily and home care given  Pt education printed and reviewed  If your doctor prescribes a cream, ointment, or other medicine, use it exactly as directed. soaks in Burow's solution, use it as directed.  Wash the affected area with water only. If you use a cleanser, use one without fragrance or soap. Soap can make dryness and itching worse.  Apply a moisturizer or barrier cream as often as you can. Use a cream such as CeraVe or Cetaphil that doesn't irritate the skin or cause a rash. Apply the cream  every time after you wash. Apply it while your skin is still damp after drying lightly with a towel.  At night, apply petroleum jelly. It protects the skin and keeps it from drying out. If you can, apply it more often.  Wear cotton gloves under work gloves when you cook, clean, garden, or work with water.  Wear clean, dry socks. Change your socks when they get wet or sweaty. Wearing moisture-wicking socks can help your feet stay dry.  Try to figure out if something triggers your symptoms. Avoid things that make them worse, and anything that causes burning or itching.  Manage or reduce your stress. Stress can make your symptoms worse.  Avoid scratching. This can lead to skin infections, rough patches of skin, or more itching. If itching affects your sleep or your normal activities, ask your doctor about treatments you can try.    Orthopedic  Referral- follow up with Dr Solis if symptoms not improving or worsening    PERRI Bates CNP

## 2023-10-09 NOTE — PROGRESS NOTES
SUBJECTIVE:      Steve Josue is a 48 year old male who is seen {[facility] CONSULT:461439} for ***    Injury:  ***    Location of Pain: {side:5002} ***  Duration of Pain:  {DAYS/WEEK(S)/MONTH(S):668075}  Rating of Pain:  {PAIN SCALE:791325}  Pain is better with:  {:710653}  Pain is worse with:  ***  Treatment so far consists of:   {:730855}  Associated Features: {Additional Symptoms:543466}  Prior history of related problems: ***    Patient's past medical, surgical, social and family histories reviewed.  Past medical history notable for: {MEDICAL HISTORY NOTABLE:721162}    REVIEW OF SYSTEMS:  CONSTITUTIONAL:{jimmy CONSTITUTIONAL:204190}  INTEGUMENTARY/SKIN: {jimmy SKIN:816064}  MUSCULOSKELETAL:See HPI above  NEURO: {[facility] NEURO ROS:334211}    There were no vitals taken for this visit.    EXAM:  GENERAL APPEARANCE: {JIMMY GENERAL APPEARANCE:50}   GAIT: {[facility] GAIT:476774}  SKIN: {JIMMY EXAM CPE - SKIN:831490}  NEURO: {JIMMY EXAM CPE - NEURO:425892}  PSYCH:  {JIMMY EXAM CPE - PSYCH:991982}    MUSCULOSKELETAL:  {[facility] FOOT EXAM:068325}    ASSESSMENT/PLAN  No diagnosis found.    X-RAY INTERPRETATION  {FOOT XRAY Interpretation:339392}    PERRI Bates CNP

## 2023-10-10 ENCOUNTER — TELEPHONE (OUTPATIENT)
Dept: PODIATRY | Facility: CLINIC | Age: 48
End: 2023-10-10
Payer: COMMERCIAL

## 2023-10-10 DIAGNOSIS — L30.9 ECZEMA: ICD-10-CM

## 2023-10-10 DIAGNOSIS — B35.3 TINEA PEDIS OF BOTH FEET: Primary | ICD-10-CM

## 2023-10-10 DIAGNOSIS — L30.1 DYSHIDROSIS: ICD-10-CM

## 2023-10-10 NOTE — TELEPHONE ENCOUNTER
Called patient to schedule. Patient did not answer. Left message for patient to call back to discuss.     Felipa WRIGHT RN, [facility] 10/10/23 12:01 PM

## 2023-10-11 PROBLEM — J32.9 CHRONIC SINUSITIS: Status: ACTIVE | Noted: 2023-10-11

## 2023-10-11 PROBLEM — F43.10 POSTTRAUMATIC STRESS DISORDER: Status: ACTIVE | Noted: 2023-10-11

## 2023-10-11 PROBLEM — J33.9 NASAL POLYPOSIS: Status: ACTIVE | Noted: 2023-10-11

## 2023-10-11 PROBLEM — K21.9 GASTROESOPHAGEAL REFLUX DISEASE: Status: ACTIVE | Noted: 2023-10-11

## 2023-10-11 PROBLEM — E78.2 MIXED HYPERLIPIDEMIA: Status: ACTIVE | Noted: 2023-10-11

## 2023-10-11 PROBLEM — L98.9 DISORDER OF THE SKIN AND SUBCUTANEOUS TISSUE, UNSPECIFIED: Status: ACTIVE | Noted: 2023-10-11

## 2023-10-11 PROBLEM — H93.19 TINNITUS: Status: ACTIVE | Noted: 2023-10-11

## 2023-10-11 PROBLEM — L98.9: Status: ACTIVE | Noted: 2023-10-11

## 2023-10-11 PROBLEM — H91.90 HEARING LOSS: Status: ACTIVE | Noted: 2023-10-11

## 2023-10-11 RX ORDER — HYDROXYZINE HYDROCHLORIDE 10 MG/1
10 TABLET, FILM COATED ORAL
COMMUNITY
Start: 2023-06-14

## 2023-10-11 RX ORDER — ACETAMINOPHEN 325 MG/1
325-650 TABLET ORAL
COMMUNITY

## 2023-10-11 RX ORDER — METHYLPREDNISOLONE 4 MG
TABLET, DOSE PACK ORAL
COMMUNITY
Start: 2023-04-25

## 2023-10-11 RX ORDER — DOCUSATE SODIUM 100 MG/1
100 CAPSULE, LIQUID FILLED ORAL
COMMUNITY
Start: 2022-10-28

## 2023-10-11 NOTE — TELEPHONE ENCOUNTER
Pt has tried antifungal treatments with no improvement.    Do you want to send to Derm?    Teresa Pedraza, CMA

## 2023-10-11 NOTE — TELEPHONE ENCOUNTER
Per   would be best if pt sees DERM. Referral placed    Sent message to triage to try and reach pt to call and discuss    Thank you,   Teresa Pedraza, CMA

## 2023-10-11 NOTE — TELEPHONE ENCOUNTER
Called patient and there was no answer. Left message for patient to call back.     Felipa WRIGHT RN, [facility] 10/11/23 10:26 AM

## 2023-10-16 ENCOUNTER — TELEPHONE (OUTPATIENT)
Dept: DERMATOLOGY | Facility: CLINIC | Age: 48
End: 2023-10-16
Payer: COMMERCIAL

## 2023-10-16 NOTE — TELEPHONE ENCOUNTER
S/w pt who states he has been seen and treated for athlete's foot since June and it has not gone away.  Pt does not think it is athlete's foot.  States it is now painful and burning and wants an answer.  Pt scheduled with Dr. Alfredo on Tuesday 10/24 at 11:30 am at [place].    Daisy RAMOS RN  [facility] [place]  [phone]

## 2023-10-16 NOTE — TELEPHONE ENCOUNTER
This encounter is being sent to inform the clinic that this patient has a referral from Arnav Solis DPM in  PODIATRY for the diagnoses of Rash - Tinea pedis of both feet [B35.3]  Dyshidrosis [L30.1]  Eczema [L30.9 and has requested that this patient be seen within 1 and/or with 2 weeks.  Based on the availability of our provider(s), we are unable to accommodate this request.    Were all sites offered this patient?  Yes    Does scheduling algorithm request to schedule next available?  Patient has been scheduled for the first available opening with Ava Romero PA-C on 12/05/23.  We have informed the patient that the clinic will review their referral and reach out if a sooner appointment is medically necessary.

## 2023-10-16 NOTE — TELEPHONE ENCOUNTER
patient informed of referral. He will contact Derm to set up an appt to discuss skin issues with his feet/nails.  Appointment with Podiatry is cancelled    Teresa Pedraza CMA

## 2023-10-24 ENCOUNTER — OFFICE VISIT (OUTPATIENT)
Dept: DERMATOLOGY | Facility: CLINIC | Age: 48
End: 2023-10-24
Payer: COMMERCIAL

## 2023-10-24 DIAGNOSIS — L30.9 DERMATITIS: Primary | ICD-10-CM

## 2023-10-24 PROCEDURE — 88305 TISSUE EXAM BY PATHOLOGIST: CPT | Performed by: DERMATOLOGY

## 2023-10-24 PROCEDURE — 11102 TANGNTL BX SKIN SINGLE LES: CPT | Performed by: DERMATOLOGY

## 2023-10-24 PROCEDURE — 99213 OFFICE O/P EST LOW 20 MIN: CPT | Mod: 25 | Performed by: DERMATOLOGY

## 2023-10-24 PROCEDURE — 88312 SPECIAL STAINS GROUP 1: CPT | Performed by: DERMATOLOGY

## 2023-10-24 RX ORDER — BETAMETHASONE DIPROPIONATE 0.5 MG/G
CREAM TOPICAL
Qty: 200 G | Refills: 3 | Status: SHIPPED | OUTPATIENT
Start: 2023-10-24

## 2023-10-24 NOTE — LETTER
10/24/2023         RE: Steve Josue  [address]  [facility] [address]        Dear Colleague,    Thank you for referring your patient, Steve Josue, to the [facility]. Please see a copy of my visit note below.    Steve Josue , a 48 year old year old male patient, I was asked to see by Dr. Solis for rash on feet.  Patient has no other skin complaints today.  Remainder of the HPI, Meds, PMH, Allergies, FH, and SH was reviewed in chart.  No joint pain, no rash on trunk elbows, knees.    No past medical history on file.    No past surgical history on file.     No family history on file.    Social History     Socioeconomic History     Marital status: Single     Spouse name: Not on file     Number of children: Not on file     Years of education: Not on file     Highest education level: Not on file   Occupational History     Not on file   Tobacco Use     Smoking status: Former     Types: Cigarettes     Smokeless tobacco: Current     Types: Chew   Vaping Use     Vaping Use: Never used   Substance and Sexual Activity     Alcohol use: Not on file     Drug use: Not on file     Sexual activity: Not on file   Other Topics Concern     Not on file   Social History Narrative     Not on file     Social Determinants of Health     Financial Resource Strain: Not on file   Food Insecurity: Not on file   Transportation Needs: Not on file   Physical Activity: Not on file   Stress: Not on file   Social Connections: Not on file   Interpersonal Safety: Not on file   Housing Stability: Not on file       Outpatient Encounter Medications as of 10/24/2023   Medication Sig Dispense Refill     acetaminophen (TYLENOL) 325 MG tablet Take 325-650 mg by mouth       Aspirin 500 MG TABS Take 1 tablet by mouth daily       clobetasol (TEMOVATE) 0.05 % external ointment Apply topically 2 times daily (Patient not taking: Reported on 8/3/2023) 60 g 0     docusate sodium (DSS) 100 MG capsule Take 100 mg by mouth        hydrocortisone (CORTAID) 0.5 % external ointment Apply topically 2 times daily (Patient not taking: Reported on 8/3/2023) 30 g 1     hydrOXYzine (ATARAX) 10 MG tablet Take 10 mg by mouth       ketoconazole (NIZORAL) 2 % external cream Apply topically daily (Patient not taking: Reported on 8/9/2023)       methylPREDNISolone (MEDROL DOSEPAK) 4 MG tablet therapy pack Take by mouth as instructed per packaging.       terbinafine (LAMISIL AT) 1 % external cream Apply topically 2 times daily 12 g 1     tiZANidine (ZANAFLEX) 4 MG tablet 4 mg       triamcinolone (KENALOG) 0.1 % external cream Apply topically 2 times daily 30 g 0     Urea 40 % CREA  (Patient not taking: Reported on 8/9/2023)       valACYclovir (VALTREX) 500 MG tablet Take 1 tablet (500 mg) by mouth 2 times daily for 3 days 6 tablet 0     No facility-administered encounter medications on file as of 10/24/2023.             Review Of Systems  Skin: As above  Eyes: negative  Ears/Nose/Throat: negative  Respiratory: No shortness of breath, dyspnea on exertion, cough, or hemoptysis  Cardiovascular: negative  Gastrointestinal: negative  Genitourinary: negative  Musculoskeletal: negative  Neurologic: negative  Psychiatric: negative  Hematologic/Lymphatic/Immunologic: negative  Endocrine: negative      O:   NAD, WDWN, Alert & Oriented, Mood & Affect wnl, Vitals stable   General appearance tyree ii   Vitals stable   Alert, oriented and in no acute distress   Fissured thick keratotis plaques on BL feet      Eyes: Conjunctivae/lids:Normal     ENT: Lips, buccal mucosa, tongue: normal    MSK:Normal    Cardiovascular: peripheral edema none    Pulm: Breathing Normal    Neuro/Psych: Orientation:Normal; Mood/Affect:Normal      A/P:  Psoriasis v Eczema  No nail pittingnoted  L foot  TANGENTIAL BIOPSY SENT OUT:  After consent, anesthesia with LEC and prep, tangential excision performed and specimen sent out for permanent section histology.  No complications and routine wound  care. Patient told to call our office in 1-2 weeks for result.       Betamethasone twice daily  Return to clinic 2 weeks virtual  Light, methotrexate and biolgoics discussed with patient   Await path   It was a pleasure speaking to Steve Josue today.  Previous clinic  notes and pertinent laboratory tests were reviewed prior to Steve Josue's visit.      Again, thank you for allowing me to participate in the care of your patient.        Sincerely,        Endy Alfredo MD

## 2023-10-24 NOTE — PROGRESS NOTES
Steve Josue , a 48 year old year old male patient, I was asked to see by Dr. Solis for rash on feet.  Patient has no other skin complaints today.  Remainder of the HPI, Meds, PMH, Allergies, FH, and SH was reviewed in chart.  No joint pain, no rash on trunk elbows, knees.    No past medical history on file.    No past surgical history on file.     No family history on file.    Social History     Socioeconomic History    Marital status: Single     Spouse name: Not on file    Number of children: Not on file    Years of education: Not on file    Highest education level: Not on file   Occupational History    Not on file   Tobacco Use    Smoking status: Former     Types: Cigarettes    Smokeless tobacco: Current     Types: Chew   Vaping Use    Vaping Use: Never used   Substance and Sexual Activity    Alcohol use: Not on file    Drug use: Not on file    Sexual activity: Not on file   Other Topics Concern    Not on file   Social History Narrative    Not on file     Social Determinants of Health     Financial Resource Strain: Not on file   Food Insecurity: Not on file   Transportation Needs: Not on file   Physical Activity: Not on file   Stress: Not on file   Social Connections: Not on file   Interpersonal Safety: Not on file   Housing Stability: Not on file       Outpatient Encounter Medications as of 10/24/2023   Medication Sig Dispense Refill    acetaminophen (TYLENOL) 325 MG tablet Take 325-650 mg by mouth      Aspirin 500 MG TABS Take 1 tablet by mouth daily      clobetasol (TEMOVATE) 0.05 % external ointment Apply topically 2 times daily (Patient not taking: Reported on 8/3/2023) 60 g 0    docusate sodium (DSS) 100 MG capsule Take 100 mg by mouth      hydrocortisone (CORTAID) 0.5 % external ointment Apply topically 2 times daily (Patient not taking: Reported on 8/3/2023) 30 g 1    hydrOXYzine (ATARAX) 10 MG tablet Take 10 mg by mouth      ketoconazole (NIZORAL) 2 % external cream Apply topically daily (Patient  not taking: Reported on 8/9/2023)      methylPREDNISolone (MEDROL DOSEPAK) 4 MG tablet therapy pack Take by mouth as instructed per packaging.      terbinafine (LAMISIL AT) 1 % external cream Apply topically 2 times daily 12 g 1    tiZANidine (ZANAFLEX) 4 MG tablet 4 mg      triamcinolone (KENALOG) 0.1 % external cream Apply topically 2 times daily 30 g 0    Urea 40 % CREA  (Patient not taking: Reported on 8/9/2023)      valACYclovir (VALTREX) 500 MG tablet Take 1 tablet (500 mg) by mouth 2 times daily for 3 days 6 tablet 0     No facility-administered encounter medications on file as of 10/24/2023.             Review Of Systems  Skin: As above  Eyes: negative  Ears/Nose/Throat: negative  Respiratory: No shortness of breath, dyspnea on exertion, cough, or hemoptysis  Cardiovascular: negative  Gastrointestinal: negative  Genitourinary: negative  Musculoskeletal: negative  Neurologic: negative  Psychiatric: negative  Hematologic/Lymphatic/Immunologic: negative  Endocrine: negative      O:   NAD, WDWN, Alert & Oriented, Mood & Affect wnl, Vitals stable   General appearance tyree ii   Vitals stable   Alert, oriented and in no acute distress   Fissured thick keratotis plaques on BL feet      Eyes: Conjunctivae/lids:Normal     ENT: Lips, buccal mucosa, tongue: normal    MSK:Normal    Cardiovascular: peripheral edema none    Pulm: Breathing Normal    Neuro/Psych: Orientation:Normal; Mood/Affect:Normal      A/P:  Psoriasis v Eczema  No nail pittingnoted  L foot  TANGENTIAL BIOPSY SENT OUT:  After consent, anesthesia with LEC and prep, tangential excision performed and specimen sent out for permanent section histology.  No complications and routine wound care. Patient told to call our office in 1-2 weeks for result.       Betamethasone twice daily  Return to clinic 2 weeks virtual  Light, methotrexate and biolgoics discussed with patient   Await path   It was a pleasure speaking to Steve Josue today.  Previous clinic   notes and pertinent laboratory tests were reviewed prior to Steve Josue's visit.

## 2023-10-24 NOTE — PATIENT INSTRUCTIONS
Wound Care Instructions     FOR SUPERFICIAL WOUNDS     [facility] [phone]    [facility] [phone]                       AFTER 24 HOURS YOU SHOULD REMOVE THE BANDAGE AND BEGIN DAILY DRESSING CHANGES AS FOLLOWS:     1) Remove Dressing.     2) Clean and dry the area with tap water using a Q-tip or sterile gauze pad.     3) Apply Vaseline, Aquaphor, Polysporin ointment or Bacitracin ointment over entire wound.  Do NOT use Neosporin ointment.     4) Cover the wound with a band-aid, or a sterile non-stick gauze pad and micropore paper tape      REPEAT THESE INSTRUCTIONS AT LEAST ONCE A DAY UNTIL THE WOUND HAS COMPLETELY HEALED.    It is an old wives tale that a wound heals better when it is exposed to air and allowed to dry out. The wound will heal faster with a better cosmetic result if it is kept moist with ointment and covered with a bandage.    **Do not let the wound dry out.**      Supplies Needed:      *Cotton tipped applicators (Q-tips)    *Polysporin Ointment or Bacitracin Ointment (NOT NEOSPORIN)    *Band-aids or non-stick gauze pads and micropore paper tape.      PATIENT INFORMATION:    During the healing process you will notice a number of changes. All wounds develop a small halo of redness surrounding the wound.  This means healing is occurring. Severe itching with extensive redness usually indicates sensitivity to the ointment or bandage tape used to dress the wound.  You should call our office if this develops.      Swelling  and/or discoloration around your surgical site is common, particularly when performed around the eye.    All wounds normally drain.  The larger the wound the more drainage there will be.  After 7-10 days, you will notice the wound beginning to shrink and new skin will begin to grow.  The wound is healed when you can see skin has formed over the entire area.  A healed wound has a healthy, shiny look to the surface and is red to dark pink in color  to normalize.  Wounds may take approximately 4-6 weeks to heal.  Larger wounds may take 6-8 weeks.  After the wound is healed you may discontinue dressing changes.    You may experience a sensation of tightness as your wound heals. This is normal and will gradually subside.    Your healed wound may be sensitive to temperature changes. This sensitivity improves with time, but if you re having a lot of discomfort, try to avoid temperature extremes.    Patients frequently experience itching after their wound appears to have healed because of the continue healing under the skin.  Plain Vaseline will help relieve the itching.        POSSIBLE COMPLICATIONS    BLEEDING:    Leave the bandage in place.  Use tightly rolled up gauze or a cloth to apply direct pressure over the bandage for 30  minutes.  Reapply pressure for an additional 30 minutes if necessary  Use additional gauze and tape to maintain pressure once the bleeding has stopped.

## 2023-10-30 LAB
PATH REPORT.COMMENTS IMP SPEC: NORMAL
PATH REPORT.FINAL DX SPEC: NORMAL
PATH REPORT.GROSS SPEC: NORMAL
PATH REPORT.MICROSCOPIC SPEC OTHER STN: NORMAL
PATH REPORT.RELEVANT HX SPEC: NORMAL

## 2023-10-31 ENCOUNTER — TELEPHONE (OUTPATIENT)
Dept: DERMATOLOGY | Facility: CLINIC | Age: 48
End: 2023-10-31
Payer: COMMERCIAL

## 2023-10-31 NOTE — TELEPHONE ENCOUNTER
Called patient - discussed  skin care below. He will try this and the cream first. If not improving he will call back.    Thank you,    Luisana BIGGSRN BSN  [facility]- [phone]      Proper skin care from [facility]:    -Eliminate harsh soaps as they strip the natural oils from the skin, often resulting in dry itchy skin ( i.e. Dial, Zest, Ly Spring)  -Use mild soaps such as Cetaphil or Dove Sensitive Skin in the shower. You do not need to use soap on arms, legs, and trunk every time you shower unless visibly soiled.   -Avoid hot or cold showers.  -After showering, lightly dry off and apply moisturizing within 2-3 minutes. This will help trap moisture in the skin.   -Aggressive use of a moisturizer at least 1-2 times a day to the entire body (including -Vanicream, Cetaphil, Aquaphor or Cerave) and moisturize hands after every washing.  -We recommend using moisturizers that come in a tub that needs to be scooped out, not a pump. This has more of an oil base. It will hold moisture in your skin much better than a water base moisturizer. The above recommended are non-pore clogging.

## 2023-10-31 NOTE — TELEPHONE ENCOUNTER
----- Message from Endy Alfredo MD sent at 10/31/2023  7:15 AM CDT -----  Biopsy showed more of an eczematous process    I would continue the cream,     As we discussed we can also try light therapy , a pill like methotrexate which requires blood work or try an injectable medication called dupixient.      What would you like to do

## 2023-11-15 ENCOUNTER — VIRTUAL VISIT (OUTPATIENT)
Dept: DERMATOLOGY | Facility: CLINIC | Age: 48
End: 2023-11-15
Payer: COMMERCIAL

## 2023-11-15 DIAGNOSIS — L30.8 SPONGIOTIC DERMATITIS: Primary | ICD-10-CM

## 2023-11-15 NOTE — LETTER
11/15/2023         RE: Steve Josue  [address]  [facility] [address]        Dear Colleague,    Thank you for referring your patient, Steve Josue, to the [facility]. Please see a copy of my visit note below.    Patient called at [phone]  No answer      Again, thank you for allowing me to participate in the care of your patient.        Sincerely,        Endy Alfredo MD

## 2024-01-09 ENCOUNTER — ALLIED HEALTH/NURSE VISIT (OUTPATIENT)
Dept: NURSING | Facility: CLINIC | Age: 49
End: 2024-01-09
Payer: COMMERCIAL

## 2024-01-09 ENCOUNTER — OFFICE VISIT (OUTPATIENT)
Dept: URGENT CARE | Facility: URGENT CARE | Age: 49
End: 2024-01-09
Payer: COMMERCIAL

## 2024-01-09 VITALS
DIASTOLIC BLOOD PRESSURE: 74 MMHG | TEMPERATURE: 97.3 F | OXYGEN SATURATION: 97 % | SYSTOLIC BLOOD PRESSURE: 115 MMHG | RESPIRATION RATE: 18 BRPM | BODY MASS INDEX: 29.71 KG/M2 | WEIGHT: 213 LBS | HEART RATE: 72 BPM

## 2024-01-09 DIAGNOSIS — L03.213 PRESEPTAL CELLULITIS OF RIGHT UPPER EYELID: Primary | ICD-10-CM

## 2024-01-09 DIAGNOSIS — S05.91XA RIGHT EYE INJURY: Primary | ICD-10-CM

## 2024-01-09 PROCEDURE — 99213 OFFICE O/P EST LOW 20 MIN: CPT

## 2024-01-09 PROCEDURE — 99207 PR NO CHARGE NURSE ONLY: CPT

## 2024-01-09 NOTE — NURSING NOTE
Patient arrives with R eye swollen shut and discharge and crusting around it. He thinks pizza grease splattered up to his eye last night. There were no symptoms last evening but he woke up to this right eyelid swollen with discharge. The only pain he currently has is at the corner of his upper lid when touched. Patient stable now so will wait for urgent care. Educated him that if any symptoms worsen to go to  and he states he understands this.   Deanne Lizarraga BSN, RN

## 2024-01-09 NOTE — PATIENT INSTRUCTIONS
Take the antibiotic as prescribed and finish the full course even if symptoms improve.  Try yogurt with active cultures or probiotics such as Culturelle daily to help prevent diarrhea while using antibiotics.  You can use warm compresses to the eye four times a day with 10 minutes on each application.  Go to the emergency department with any new or worsening symptoms.

## 2024-01-09 NOTE — NURSING NOTE
Patient triaged and will see urgent care.   See triage notes in urgent care visit notes.     Deanne BHAKTAN, RN